# Patient Record
Sex: FEMALE | Race: WHITE | NOT HISPANIC OR LATINO | Employment: UNEMPLOYED
[De-identification: names, ages, dates, MRNs, and addresses within clinical notes are randomized per-mention and may not be internally consistent; named-entity substitution may affect disease eponyms.]

---

## 2019-11-12 ENCOUNTER — HOSPITAL (OUTPATIENT)
Dept: OTHER | Age: 49
End: 2019-11-12
Attending: OBSTETRICS & GYNECOLOGY

## 2020-08-12 ENCOUNTER — OFFICE VISIT (OUTPATIENT)
Dept: OBGYN CLINIC | Facility: CLINIC | Age: 50
End: 2020-08-12
Payer: COMMERCIAL

## 2020-08-12 VITALS
SYSTOLIC BLOOD PRESSURE: 122 MMHG | DIASTOLIC BLOOD PRESSURE: 72 MMHG | WEIGHT: 180 LBS | HEIGHT: 62 IN | BODY MASS INDEX: 33.13 KG/M2

## 2020-08-12 DIAGNOSIS — Z12.31 ENCOUNTER FOR SCREENING MAMMOGRAM FOR BREAST CANCER: ICD-10-CM

## 2020-08-12 DIAGNOSIS — N95.2 POST-MENOPAUSAL ATROPHIC VAGINITIS: ICD-10-CM

## 2020-08-12 DIAGNOSIS — Z01.419 WOMEN'S ANNUAL ROUTINE GYNECOLOGICAL EXAMINATION: Primary | ICD-10-CM

## 2020-08-12 DIAGNOSIS — Z00.00 LABORATORY EXAMINATION ORDERED AS PART OF A ROUTINE GENERAL MEDICAL EXAMINATION: ICD-10-CM

## 2020-08-12 PROCEDURE — 3078F DIAST BP <80 MM HG: CPT | Performed by: OBSTETRICS & GYNECOLOGY

## 2020-08-12 PROCEDURE — 3074F SYST BP LT 130 MM HG: CPT | Performed by: OBSTETRICS & GYNECOLOGY

## 2020-08-12 PROCEDURE — 99386 PREV VISIT NEW AGE 40-64: CPT | Performed by: OBSTETRICS & GYNECOLOGY

## 2020-08-12 PROCEDURE — 3008F BODY MASS INDEX DOCD: CPT | Performed by: OBSTETRICS & GYNECOLOGY

## 2020-08-12 NOTE — PROGRESS NOTES
Annual Gyn Exam    HPI  Pt is a transfer from Dr. Pato Morillo who has retired. Pt is here for an annual gynecologic evaluation. Patient states she has a history of arthritis and has some nodules on her hands that she is concerned about.     OB History   Grav Negative for abdominal pain, blood in stool, constipation, diarrhea, nausea and vomiting. Genitourinary: Positive for dyspareunia. Negative for dysuria, frequency, hematuria and vaginal discharge. Neurological: Negative for headaches.        Physical Ex ordered. Discussed recommendation for screening colonoscopy beginning at age 46. Patient had previously was told to return in 3 to 5 years due to polyps. Discussed recommendations for annual flu vaccine, patient gets regularly.   Discussed recommendation

## 2020-08-13 LAB — HPV I/H RISK 1 DNA SPEC QL NAA+PROBE: NEGATIVE

## 2020-08-17 ENCOUNTER — TELEPHONE (OUTPATIENT)
Dept: OBGYN CLINIC | Facility: CLINIC | Age: 50
End: 2020-08-17

## 2020-08-17 NOTE — TELEPHONE ENCOUNTER
----- Message from Michael Contreras MD sent at 8/15/2020 10:06 AM CDT -----  Please send patient letter stating normal pap smear and HPV negative. She will need an annual exam in one year and a follow up pap smear in 3 years.

## 2020-09-02 DIAGNOSIS — Z12.31 ENCOUNTER FOR SCREENING MAMMOGRAM FOR MALIGNANT NEOPLASM OF BREAST: Primary | ICD-10-CM

## 2020-09-21 ENCOUNTER — HOSPITAL ENCOUNTER (OUTPATIENT)
Dept: MAMMOGRAPHY | Age: 50
End: 2020-09-21
Attending: NURSE PRACTITIONER

## 2020-11-19 ENCOUNTER — APPOINTMENT (OUTPATIENT)
Dept: MAMMOGRAPHY | Age: 50
End: 2020-11-19
Attending: NURSE PRACTITIONER

## 2020-11-20 ENCOUNTER — HOSPITAL ENCOUNTER (OUTPATIENT)
Dept: MAMMOGRAPHY | Age: 50
Discharge: HOME | End: 2020-11-20
Attending: NURSE PRACTITIONER

## 2020-11-20 DIAGNOSIS — Z12.31 ENCOUNTER FOR SCREENING MAMMOGRAM FOR MALIGNANT NEOPLASM OF BREAST: ICD-10-CM

## 2020-11-20 PROCEDURE — 77067 SCR MAMMO BI INCL CAD: CPT

## 2021-09-29 ENCOUNTER — OFFICE VISIT (OUTPATIENT)
Dept: OBGYN CLINIC | Facility: CLINIC | Age: 51
End: 2021-09-29
Payer: COMMERCIAL

## 2021-09-29 VITALS
SYSTOLIC BLOOD PRESSURE: 112 MMHG | BODY MASS INDEX: 32 KG/M2 | WEIGHT: 174 LBS | HEART RATE: 85 BPM | DIASTOLIC BLOOD PRESSURE: 78 MMHG

## 2021-09-29 DIAGNOSIS — Z12.31 SCREENING MAMMOGRAM, ENCOUNTER FOR: ICD-10-CM

## 2021-09-29 DIAGNOSIS — N95.2 POST-MENOPAUSAL ATROPHIC VAGINITIS: ICD-10-CM

## 2021-09-29 DIAGNOSIS — R68.82 DECREASED LIBIDO: ICD-10-CM

## 2021-09-29 DIAGNOSIS — Z01.419 WOMEN'S ANNUAL ROUTINE GYNECOLOGICAL EXAMINATION: Primary | ICD-10-CM

## 2021-09-29 PROCEDURE — 3074F SYST BP LT 130 MM HG: CPT | Performed by: OBSTETRICS & GYNECOLOGY

## 2021-09-29 PROCEDURE — 3078F DIAST BP <80 MM HG: CPT | Performed by: OBSTETRICS & GYNECOLOGY

## 2021-09-29 PROCEDURE — 99396 PREV VISIT EST AGE 40-64: CPT | Performed by: OBSTETRICS & GYNECOLOGY

## 2021-09-29 NOTE — PROGRESS NOTES
Annual Gyn Exam    HPI  Hema Parra is a known patient of mine and is here for an annual gynecologic evaluation.     OB History    Para Term  AB Living   3 3 3     3   SAB IAB Ectopic Multiple Live Births           3      # Outcome Date GA Lbr Prashant Okeefe in stool, constipation, diarrhea, nausea and vomiting. Genitourinary: Positive for dyspareunia. Negative for dysuria, frequency, hematuria and vaginal discharge. Pt with decreased libido and vaginal dryness/discomfort with intercourse.   She picked gynecological examination  A: 46 y.o. [id number] here for annual gynecologic evaluation  Exam, self breast exam done. Pap not done due to NL pap/HPV negative last year. Discussed yearly mammogram recommendations, mammogram ordered.   Discussed recommendation

## 2021-11-22 ENCOUNTER — APPOINTMENT (OUTPATIENT)
Dept: MAMMOGRAPHY | Age: 51
End: 2021-11-22
Attending: INTERNAL MEDICINE

## 2021-11-26 ENCOUNTER — APPOINTMENT (OUTPATIENT)
Dept: MAMMOGRAPHY | Age: 51
End: 2021-11-26
Attending: INTERNAL MEDICINE

## 2021-12-15 ENCOUNTER — HOSPITAL ENCOUNTER (OUTPATIENT)
Dept: MAMMOGRAPHY | Age: 51
End: 2021-12-15
Attending: INTERNAL MEDICINE

## 2021-12-28 ENCOUNTER — HOSPITAL ENCOUNTER (OUTPATIENT)
Dept: MAMMOGRAPHY | Age: 51
Discharge: HOME | End: 2021-12-28
Attending: INTERNAL MEDICINE

## 2021-12-28 DIAGNOSIS — Z12.31 ENCOUNTER FOR SCREENING MAMMOGRAM FOR MALIGNANT NEOPLASM OF BREAST: ICD-10-CM

## 2021-12-28 PROCEDURE — 77063 BREAST TOMOSYNTHESIS BI: CPT

## 2023-11-06 ENCOUNTER — TELEPHONE (OUTPATIENT)
Dept: OBGYN CLINIC | Facility: CLINIC | Age: 53
End: 2023-11-06

## 2023-11-06 NOTE — TELEPHONE ENCOUNTER
Patient is wondering if any mammogram results from [facility] were received by this office. Please advise.

## 2023-11-07 NOTE — TELEPHONE ENCOUNTER
Please notify patient of normal mammogram.  She was last seen 9/2021 and is due for an annual exam.  Victoria Mcfarland MD, MD  11/7/2023  4:53 PM

## 2023-11-09 DIAGNOSIS — Z12.31 VISIT FOR SCREENING MAMMOGRAM: Primary | ICD-10-CM

## 2023-11-09 NOTE — TELEPHONE ENCOUNTER
Patient verified name and     Aware of recommendations. States she is overdue for mammogram, will be completing soon and wants results faxed to [facility]. Fax number provided. Discussed with patient need for annual exam. Verbalized understanding and agreed will call back to schedule.

## 2023-11-11 ENCOUNTER — HOSPITAL ENCOUNTER (OUTPATIENT)
Dept: MAMMOGRAPHY | Age: 53
Discharge: HOME | End: 2023-11-11
Attending: INTERNAL MEDICINE

## 2023-11-11 DIAGNOSIS — Z12.31 VISIT FOR SCREENING MAMMOGRAM: ICD-10-CM

## 2023-11-11 PROCEDURE — 77063 BREAST TOMOSYNTHESIS BI: CPT

## 2023-11-11 PROCEDURE — 77067 SCR MAMMO BI INCL CAD: CPT

## 2025-01-31 DIAGNOSIS — Z12.31 VISIT FOR SCREENING MAMMOGRAM: Primary | ICD-10-CM

## 2025-02-04 ENCOUNTER — HOSPITAL ENCOUNTER (OUTPATIENT)
Dept: MAMMOGRAPHY | Age: 55
End: 2025-02-04
Attending: INTERNAL MEDICINE

## 2025-02-08 ENCOUNTER — HOSPITAL ENCOUNTER (OUTPATIENT)
Dept: MAMMOGRAPHY | Age: 55
End: 2025-02-08
Attending: INTERNAL MEDICINE

## 2025-02-08 DIAGNOSIS — Z12.31 VISIT FOR SCREENING MAMMOGRAM: ICD-10-CM

## 2025-02-08 PROCEDURE — 77067 SCR MAMMO BI INCL CAD: CPT

## (undated) NOTE — LETTER
9/11/2020              [facility]        [address]         Dear Mary Cornell,    [facility] records indicate that the mammogram and lab tests ordered for you by Abby Rodriguez MD, have not been done.   If you have, in fact, alread

## (undated) NOTE — MR AVS SNAPSHOT
After Visit Summary   8/12/2020    Cassidy Balderas    MRN: [id number]           Visit Information     Date & Time  8/12/2020  9:00 AM Provider  Kandace Bearden MD [address] [place] [address] Dept.  Phone  [phone] between 7:30am to 6pm and on Saturday between [facility] [place]        Taylor Valdes          It is the patient's responsibility to check with and follow their insurance company' cannot be changed, so think of one that is secure and easy to remember. 6. Create a "[employer] password. You can change your password at any time. 7. Choose a Security Question and enter your Answer and click Next.  This can be used at a later time if you fo Don’t forget strength training with weights and resistance Set goals and track your progress   You don’t need to join a gym. Home exercises work great.  Put more priority on exercise in your life           DM now offers Video Visits through [address] for a Average cost  $70*       [facility] [address]   Monday – Friday  10:00 am – 10:00 pm   Saturday – Sunday  10:00 am – 4:00 pm     [facility]   Monday – Friday  4:00 pm – 10:00 pm   [place]

## (undated) NOTE — LETTER
1/21/2022              [facility]        [address]         Dear Christa Hamlin,     According to our records, you are overdue for your annual mammography screening.       Mammograms are the best method to detect breast cance